# Patient Record
Sex: FEMALE | Race: WHITE | NOT HISPANIC OR LATINO | URBAN - METROPOLITAN AREA
[De-identification: names, ages, dates, MRNs, and addresses within clinical notes are randomized per-mention and may not be internally consistent; named-entity substitution may affect disease eponyms.]

---

## 2017-08-16 ENCOUNTER — OUTPATIENT (OUTPATIENT)
Dept: OUTPATIENT SERVICES | Facility: HOSPITAL | Age: 62
LOS: 1 days | Discharge: HOME | End: 2017-08-16

## 2017-08-16 DIAGNOSIS — C50.411 MALIGNANT NEOPLASM OF UPPER-OUTER QUADRANT OF RIGHT FEMALE BREAST: ICD-10-CM

## 2019-08-14 ENCOUNTER — RECORD ABSTRACTING (OUTPATIENT)
Age: 64
End: 2019-08-14

## 2019-08-14 DIAGNOSIS — E11.9 TYPE 2 DIABETES MELLITUS W/OUT COMPLICATIONS: ICD-10-CM

## 2019-08-14 DIAGNOSIS — C50.411 MALIGNANT NEOPLASM OF UPPER-OUTER QUADRANT OF RIGHT FEMALE BREAST: ICD-10-CM

## 2019-08-14 DIAGNOSIS — Z80.1 FAMILY HISTORY OF MALIGNANT NEOPLASM OF TRACHEA, BRONCHUS AND LUNG: ICD-10-CM

## 2019-08-14 PROBLEM — Z00.00 ENCOUNTER FOR PREVENTIVE HEALTH EXAMINATION: Status: ACTIVE | Noted: 2019-08-14

## 2019-08-14 RX ORDER — DIAPER,BRIEF,INFANT-TODD,DISP
EACH MISCELLANEOUS
Refills: 0 | Status: ACTIVE | COMMUNITY

## 2019-08-14 RX ORDER — B-COMPLEX WITH VITAMIN C
TABLET ORAL
Refills: 0 | Status: ACTIVE | COMMUNITY

## 2019-08-14 RX ORDER — OMEGA-3/DHA/EPA/FISH OIL 300-1000MG
CAPSULE ORAL
Refills: 0 | Status: ACTIVE | COMMUNITY

## 2019-09-12 ENCOUNTER — APPOINTMENT (OUTPATIENT)
Dept: RADIATION ONCOLOGY | Facility: CLINIC | Age: 64
End: 2019-09-12

## 2019-10-31 ENCOUNTER — APPOINTMENT (OUTPATIENT)
Dept: RADIATION ONCOLOGY | Facility: HOSPITAL | Age: 64
End: 2019-10-31

## 2019-11-06 ENCOUNTER — APPOINTMENT (OUTPATIENT)
Dept: RADIATION ONCOLOGY | Facility: HOSPITAL | Age: 64
End: 2019-11-06
Payer: COMMERCIAL

## 2019-11-06 PROCEDURE — 99212 OFFICE O/P EST SF 10 MIN: CPT

## 2019-11-06 RX ORDER — METFORMIN HYDROCHLORIDE 625 MG/1
TABLET ORAL
Refills: 0 | Status: DISCONTINUED | COMMUNITY
End: 2019-11-06

## 2019-11-06 NOTE — PHYSICAL EXAM
[Normal] : normal heart rate and rhythm, normal S1 and S2, and no murmurs present [de-identified] : As previously noted, significant post-operative changes, although otherwise the breast is soft without suspicious findigns.   left breast is negatie.

## 2019-11-06 NOTE — DISEASE MANAGEMENT
[Pathological] : TNM Stage: p [0] : 0 [TTNM] : cis [NTNM] : 0 [MTNM] : 0 [de-identified] : DCIS s/p WBRT   Interval since treatment is 2 yrs

## 2019-11-06 NOTE — HISTORY OF PRESENT ILLNESS
[FreeTextEntry1] : Follow up 11/6/19 Pt  reports chronic left knee pain 8/10 denies any pain to breast. Last mammo from [facility] bilateral 4/2019 is negative, results were given to pt prior.  States right breast is tender to the point that she cannot sleep on that side.

## 2020-10-07 ENCOUNTER — APPOINTMENT (OUTPATIENT)
Dept: RADIATION ONCOLOGY | Facility: HOSPITAL | Age: 65
End: 2020-10-07

## 2020-11-02 ENCOUNTER — APPOINTMENT (OUTPATIENT)
Dept: RADIATION ONCOLOGY | Facility: HOSPITAL | Age: 65
End: 2020-11-02
Payer: COMMERCIAL

## 2020-11-02 VITALS
RESPIRATION RATE: 16 BRPM | SYSTOLIC BLOOD PRESSURE: 153 MMHG | WEIGHT: 151 LBS | OXYGEN SATURATION: 97 % | DIASTOLIC BLOOD PRESSURE: 73 MMHG | HEART RATE: 97 BPM | TEMPERATURE: 97 F

## 2020-11-02 PROCEDURE — 99212 OFFICE O/P EST SF 10 MIN: CPT

## 2020-11-02 NOTE — HISTORY OF PRESENT ILLNESS
[FreeTextEntry1] : 11/2/2020 3 year Follow up: Last mammogram done 6/2020, instructed to repeat in 6 months to follow up on calcifications. Followed up with breast surgeon Dr. Eastman. Followed up with med/onc, taking tamoxifen with complains of dizziness and upset stomach. Continues to struggle with limited range of motion.

## 2020-11-02 NOTE — PHYSICAL EXAM
[Normal] : normal heart rate and rhythm, normal S1 and S2, and no murmurs present [de-identified] : As previously noted, significant post-operative changes, although otherwise the breast is soft without suspicious findigns.   left breast is negative.

## 2020-11-02 NOTE — DISEASE MANAGEMENT
[Pathological] : TNM Stage: p [0] : 0 [TTNM] : cis [NTNM] : 0 [MTNM] : 0 [de-identified] : DCIS s/p WBRT   Interval since treatment is 3 yrs

## 2021-01-27 ENCOUNTER — APPOINTMENT (OUTPATIENT)
Dept: BREAST CENTER | Facility: CLINIC | Age: 66
End: 2021-01-27

## 2021-04-16 ENCOUNTER — APPOINTMENT (OUTPATIENT)
Dept: BREAST CENTER | Facility: CLINIC | Age: 66
End: 2021-04-16

## 2021-05-10 ENCOUNTER — APPOINTMENT (OUTPATIENT)
Dept: BREAST CENTER | Facility: CLINIC | Age: 66
End: 2021-05-10
Payer: COMMERCIAL

## 2021-05-10 VITALS
WEIGHT: 151 LBS | SYSTOLIC BLOOD PRESSURE: 130 MMHG | TEMPERATURE: 97.2 F | HEIGHT: 60 IN | DIASTOLIC BLOOD PRESSURE: 88 MMHG | BODY MASS INDEX: 29.64 KG/M2

## 2021-05-10 DIAGNOSIS — Z86.000 PERSONAL HISTORY OF IN-SITU NEOPLASM OF BREAST: ICD-10-CM

## 2021-05-10 DIAGNOSIS — Z86.39 PERSONAL HISTORY OF OTHER ENDOCRINE, NUTRITIONAL AND METABOLIC DISEASE: ICD-10-CM

## 2021-05-10 DIAGNOSIS — Z80.3 FAMILY HISTORY OF MALIGNANT NEOPLASM OF BREAST: ICD-10-CM

## 2021-05-10 DIAGNOSIS — R92.2 INCONCLUSIVE MAMMOGRAM: ICD-10-CM

## 2021-05-10 DIAGNOSIS — R92.8 OTHER ABNORMAL AND INCONCLUSIVE FINDINGS ON DIAGNOSTIC IMAGING OF BREAST: ICD-10-CM

## 2021-05-10 PROCEDURE — 99203 OFFICE O/P NEW LOW 30 MIN: CPT

## 2021-05-10 PROCEDURE — 99072 ADDL SUPL MATRL&STAF TM PHE: CPT

## 2021-05-12 ENCOUNTER — RESULT REVIEW (OUTPATIENT)
Age: 66
End: 2021-05-12

## 2021-05-12 ENCOUNTER — OUTPATIENT (OUTPATIENT)
Dept: OUTPATIENT SERVICES | Facility: HOSPITAL | Age: 66
LOS: 1 days | Discharge: HOME | End: 2021-05-12
Payer: COMMERCIAL

## 2021-05-12 DIAGNOSIS — R92.8 OTHER ABNORMAL AND INCONCLUSIVE FINDINGS ON DIAGNOSTIC IMAGING OF BREAST: ICD-10-CM

## 2021-05-12 PROBLEM — Z80.3 FAMILY HISTORY OF BREAST CANCER: Status: ACTIVE | Noted: 2019-08-14

## 2021-05-12 PROBLEM — Z86.39 HISTORY OF DIABETES MELLITUS: Status: RESOLVED | Noted: 2021-05-12 | Resolved: 2021-05-12

## 2021-05-12 PROBLEM — Z86.000 HISTORY OF DUCTAL CARCINOMA IN SITU (DCIS) OF BREAST: Status: ACTIVE | Noted: 2021-05-12

## 2021-05-12 PROCEDURE — G0279: CPT | Mod: 26

## 2021-05-12 PROCEDURE — 76641 ULTRASOUND BREAST COMPLETE: CPT | Mod: 26,50

## 2021-05-12 PROCEDURE — 77066 DX MAMMO INCL CAD BI: CPT | Mod: 26

## 2021-05-12 NOTE — PHYSICAL EXAM
[No Axillary Lymphadenopathy] : no left axillary lymphadenopathy [Soft] : abdomen soft [Not Tender] : non-tender [No Edema] : no edema [No Rashes] : no rashes [No Ulceration] : no ulceration [Normocephalic] : normocephalic [Atraumatic] : atraumatic [EOMI] : extra ocular movement intact [No Supraclavicular Adenopathy] : no supraclavicular adenopathy [No Cervical Adenopathy] : no cervical adenopathy [Examined in the supine and seated position] : examined in the supine and seated position [Symmetrical] : symmetrical [No dominant masses] : no dominant masses in right breast  [No dominant masses] : no dominant masses left breast [No Nipple Retraction] : no left nipple retraction [No Nipple Discharge] : no left nipple discharge [de-identified] : surgical incision is well healed, but no suspicious abnormalities palpated  [de-identified] : no suspicious abnormalities

## 2021-05-12 NOTE — ASSESSMENT
[FreeTextEntry1] : Ofelia is a 65 F who presents with a history of b/l DCIS, now with a R breast BIRADS 3 calcifications. \par \par ON exam, I was not able to palpate any suspicious abnormalities within either breast. \par \par Her most recent imaging was a R dx mammogram on 12/30/2020 which revealed stable R breast calcifications, deemed BIRADS 3. \par \par She is due for a b/l dx mammogram and US now.  This will be scheduled for her today.  If unrevealing, she can follow up in 1 year after a b/l dx mammogram.  \par \par All of her questions were answered.  She knows to call with any further questions or concerns. \par \par PLAN: \par -b/l dx mammogram and US now\par -R Stereobx for BIRADS 3 abnormalities vs. continued observation

## 2021-05-12 NOTE — PAST MEDICAL HISTORY
[Menarche Age ____] : age at menarche was [unfilled] [Menopause Age____] : age at menopause was [unfilled] [History of Hormone Replacement Treatment] : has no history of hormone replacement treatment [Total Preg ___] : G[unfilled] [Live Births ___] : P[unfilled]  [Age At Live Birth ___] : Age at live birth: [unfilled] [FreeTextEntry5] : denies  [FreeTextEntry6] : denies [FreeTextEntry7] : denies [FreeTextEntry8] : denies

## 2021-05-12 NOTE — REVIEW OF SYSTEMS
[Negative] : Heme/Lymph [As Noted in HPI] : as noted in HPI [Skin Lesions] : no skin lesions [Skin Wound] : no skin wound [Breast Pain] : no breast pain [Breast Lump] : no breast lump

## 2021-05-12 NOTE — HISTORY OF PRESENT ILLNESS
[FreeTextEntry1] : Ofelia is a 65 F who presents with a history of R breast DCIS, treated in 2017, history of L breast DCIS, treated in , seeking to establish care with a breast surgeon. \par \par Her past breast history is as follows: \par -s/p L WLE, no RT, no endocrine \par -s/p R WLE, R WBI, tamoxifen x 4 years \par -stopped tamoxifen 6 months prior for side effects\par \par She has no breast related complaints at this time.  She denies any breast pain, has not palpated any new palpable masses in either breast and denies any nipple discharge or retraction.  \par \par Her most recent imaging was performed on 2020, a R dx mammogram which revealed benign appearing probable fat necrosis type calcifications @ lumpectomy site, unchanged, deemed BIRADS 3. \par \par HISTORICAL RISK FACTORS: \par -prior breast biopsies and surgeries as above \par -family history of breast cancer in her mother at age 35 \par -, age at first live birth was 25 \par -no prior OCP use \par -no gyn surgeries\par

## 2021-05-13 PROBLEM — R92.8 ABNORMAL FINDING ON BREAST IMAGING: Status: ACTIVE | Noted: 2021-05-13

## 2021-05-14 ENCOUNTER — NON-APPOINTMENT (OUTPATIENT)
Age: 66
End: 2021-05-14

## 2021-06-04 ENCOUNTER — OUTPATIENT (OUTPATIENT)
Dept: OUTPATIENT SERVICES | Facility: HOSPITAL | Age: 66
LOS: 1 days | Discharge: HOME | End: 2021-06-04

## 2021-06-04 ENCOUNTER — APPOINTMENT (OUTPATIENT)
Dept: HEMATOLOGY ONCOLOGY | Facility: CLINIC | Age: 66
End: 2021-06-04
Payer: COMMERCIAL

## 2021-06-04 VITALS
HEIGHT: 60 IN | DIASTOLIC BLOOD PRESSURE: 81 MMHG | SYSTOLIC BLOOD PRESSURE: 146 MMHG | HEART RATE: 88 BPM | WEIGHT: 150 LBS | BODY MASS INDEX: 29.45 KG/M2 | TEMPERATURE: 97.3 F

## 2021-06-04 DIAGNOSIS — Z79.811 LONG TERM (CURRENT) USE OF AROMATASE INHIBITORS: ICD-10-CM

## 2021-06-04 DIAGNOSIS — D05.11 INTRADUCTAL CARCINOMA IN SITU OF RIGHT BREAST: ICD-10-CM

## 2021-06-04 PROCEDURE — 99204 OFFICE O/P NEW MOD 45 MIN: CPT

## 2021-06-04 RX ORDER — ANASTROZOLE TABLETS 1 MG/1
1 TABLET ORAL
Qty: 90 | Refills: 1 | Status: ACTIVE | COMMUNITY
Start: 2021-06-04 | End: 1900-01-01

## 2021-06-04 RX ORDER — REPAGLINIDE 1 MG/1
TABLET ORAL
Refills: 0 | Status: ACTIVE | COMMUNITY

## 2021-06-07 ENCOUNTER — NON-APPOINTMENT (OUTPATIENT)
Age: 66
End: 2021-06-07

## 2021-06-12 PROBLEM — D05.11 DUCTAL CARCINOMA IN SITU (DCIS) OF RIGHT BREAST: Status: ACTIVE | Noted: 2019-11-06

## 2021-06-12 PROBLEM — Z79.811 AROMATASE INHIBITOR USE: Status: ACTIVE | Noted: 2021-06-04

## 2021-06-12 NOTE — ASSESSMENT
[FreeTextEntry1] : 65 year old post menopausal female with h/o b/l breast DCIS. \par \par # Lt breast DCIS in 2011 s/p L WLE, no RT - s/p endocrine therapy with tamoxifen for 5 years. \par # Rt Breast DCIS in 2017 - underwent excision 3 times as her margins were positive and received WBRT- DCIS high nuclear grade with ALH, ER 99%- No endocrine therapy. \par \par Recommendations:\par We discussed the diagnosis, prognosis and management of DCIS with pt in detail. She was found to have DCIS in Lt breast in 2011 and underwent  L WLE, no RT was done and took endocrine therapy with tamoxifen for 5 years. \par \par She was again found to have Rt breast DCIS in 2017 -high nuclear grade with ALH, ER 99% s/p lumpectomy and WBRT. But she was unable to take endocrine therapy due to S. E. She does not remember the name of the medication. \par \par She is concerning the risk of breast cancer and wants to try endocrine therapy although it has been 4 years since her last DCIS diagnosis. We discussed the choice of adjuvant endocrine therapy including Tamoxifen and Anastrozole. 5-year Tamoxifen is the standard adjuvant endocrine therapy for hormone receptor positive DCIS. The potential side effects may include but not limit to a small increased risk for endodermal cancer, increased risk for cardiovascular events, thrombosis, vasomotor symptoms, depression and cataract. Anastrozole is another option. In the randomized clinical trial, Anastrozole demonstrated comparable efficacy to Tamoxifen but better side effect profile. The main side effects associated with Anastrozole are muscular and skeletal aching, arthralgia, loss of bone density, osteopenia and osteoporosis, a small increase risk of cardiovascular events and slightly increase of hyperlipidemia. She opted to take Anastrozole. A script was sent to her pharmacy. \par \par We discussed bone health management given AI may reduce bone density. She is advised to take calcium and vitamin D supplement. We will order Dexa scan. \par \par Prescription for Anastrozole was sent to her pharmacy. \par \par She will get her repeat mammogram and US b/l breast in 6 months- Due in Nov 2021. \par \par All her questions ere answered appropriately. She will come back for followup visit in 3 months.\par \par Patient was seen and examined and discussed with Dr Flannery who agrees with the plan of care. \par

## 2021-06-12 NOTE — CONSULT LETTER
[Dear  ___] : Dear  [unfilled], [Consult Letter:] : I had the pleasure of evaluating your patient, [unfilled]. [Please see my note below.] : Please see my note below. [Consult Closing:] : Thank you very much for allowing me to participate in the care of this patient.  If you have any questions, please do not hesitate to contact me. [Sincerely,] : Sincerely, [FreeTextEntry3] : Danilo Flannery MD

## 2021-06-12 NOTE — HISTORY OF PRESENT ILLNESS
[de-identified] : 65 female is referred by Dr. Billy for consultation of adjuvant endocrine therapy for DCIS.  Patient has a history of Lt breast DCIS in 2011 and underwent  L WLE at [facility] , no RT,  took endocrine therapy with tamoxifen for 5 years. In 2017, she was diagnosed with Rt Breast DCIS and underwent excision 3 times as margins were positive and received WBRT- DCIS high nuclear grade with ALH, ER 99%. She saw medical Oncologist at [facility] and tried on 3 different endocrine therapy(names unknown) but she was unable to tolerate due to S.E of musculoskeletal pains, GI symptoms and anxiety issues. She has not taken any endocrine therapy since 2018. \par She goes for routine mammograms now and her last b/l mammo and US b./l breast in May 2021 showed BIRADS3- 6 months follow up was recommended. \par \par She has no breast related complaints at this time. She denies any breast pain, has not palpated any new palpable masses in either breast and denies any nipple discharge or retraction. No prior h/o stroke, CAD, DVT or PE. \par She is active and works in the de[employer] of [employer]. \par \par Family history of breast cancer in her mother at age 35 , Lung cancer in dad at 50s (smoker)\par Patient also stated that she was tested for BRCA mutation and was negative. \par \par \par \par

## 2021-06-12 NOTE — PHYSICAL EXAM
[Restricted in physically strenuous activity but ambulatory and able to carry out work of a light or sedentary nature] : Status 1- Restricted in physically strenuous activity but ambulatory and able to carry out work of a light or sedentary nature, e.g., light house work, office work [Normal] : affect appropriate [de-identified] : Right Breast: Deformed from prior multiple surgeries of the Rt breast. surgical incision noted.  no suspicious abnormalities palpated \par Left Breast: no suspicious abnormalities

## 2021-07-16 DIAGNOSIS — Z79.811 LONG TERM (CURRENT) USE OF AROMATASE INHIBITORS: ICD-10-CM

## 2021-07-16 DIAGNOSIS — D05.11 INTRADUCTAL CARCINOMA IN SITU OF RIGHT BREAST: ICD-10-CM

## 2021-11-04 ENCOUNTER — APPOINTMENT (OUTPATIENT)
Dept: RADIATION ONCOLOGY | Facility: HOSPITAL | Age: 66
End: 2021-11-04

## 2022-02-03 ENCOUNTER — APPOINTMENT (OUTPATIENT)
Dept: HEMATOLOGY ONCOLOGY | Facility: CLINIC | Age: 67
End: 2022-02-03

## 2022-03-11 ENCOUNTER — OUTPATIENT (OUTPATIENT)
Dept: OUTPATIENT SERVICES | Facility: HOSPITAL | Age: 67
LOS: 1 days | Discharge: HOME | End: 2022-03-11
Payer: COMMERCIAL

## 2022-03-11 DIAGNOSIS — E07.9 DISORDER OF THYROID, UNSPECIFIED: ICD-10-CM

## 2022-03-11 PROCEDURE — 76536 US EXAM OF HEAD AND NECK: CPT | Mod: 26

## 2022-11-21 ENCOUNTER — RX ONLY (RX ONLY)
Age: 67
End: 2022-11-21

## 2022-11-21 ENCOUNTER — OFFICE (OUTPATIENT)
Dept: URBAN - METROPOLITAN AREA CLINIC 92 | Facility: CLINIC | Age: 67
Setting detail: OPHTHALMOLOGY
End: 2022-11-21
Payer: COMMERCIAL

## 2022-11-21 DIAGNOSIS — H25.13: ICD-10-CM

## 2022-11-21 DIAGNOSIS — H01.001: ICD-10-CM

## 2022-11-21 DIAGNOSIS — H43.813: ICD-10-CM

## 2022-11-21 DIAGNOSIS — E11.9: ICD-10-CM

## 2022-11-21 DIAGNOSIS — H18.513: ICD-10-CM

## 2022-11-21 DIAGNOSIS — H01.002: ICD-10-CM

## 2022-11-21 PROBLEM — H01.004 BLEPHARITIS; RIGHT UPPER LID, RIGHT LOWER LID, LEFT UPPER LID, LEFT LOWER LID: Status: ACTIVE | Noted: 2022-11-21

## 2022-11-21 PROBLEM — H01.005 BLEPHARITIS; RIGHT UPPER LID, RIGHT LOWER LID, LEFT UPPER LID, LEFT LOWER LID: Status: ACTIVE | Noted: 2022-11-21

## 2022-11-21 PROCEDURE — 92025 CPTRIZED CORNEAL TOPOGRAPHY: CPT | Performed by: SPECIALIST

## 2022-11-21 PROCEDURE — 92014 COMPRE OPH EXAM EST PT 1/>: CPT | Performed by: SPECIALIST

## 2022-11-21 PROCEDURE — 92134 CPTRZ OPH DX IMG PST SGM RTA: CPT | Performed by: SPECIALIST

## 2022-11-21 PROCEDURE — 92136 OPHTHALMIC BIOMETRY: CPT | Performed by: SPECIALIST

## 2022-11-21 ASSESSMENT — KERATOMETRY
OS_CYLPOWER_DEGREES: 1
OD_AXISANGLE2_DEGREES: 29
OD_K1K2_AVERAGE: 43.125
OS_AXISANGLE_DEGREES: 172
OS_K2POWER_DIOPTERS: 43.50
OS_K1POWER_DIOPTERS: 42.50
OD_K2POWER_DIOPTERS: 43.50
OD_CYLAXISANGLE_DEGREES: 29
OD_AXISANGLE_DEGREES: 119
OD_AXISANGLE_DEGREES: 29
OD_K1POWER_DIOPTERS: 42.75
OS_CYLAXISANGLE_DEGREES: 172
OS_K1POWER_DIOPTERS: 42.50
OD_K1POWER_DIOPTERS: 42.75
OS_AXISANGLE2_DEGREES: 172
OS_AXISANGLE_DEGREES: 82
OD_CYLPOWER_DEGREES: 0.75
OS_K2POWER_DIOPTERS: 43.50
OD_K2POWER_DIOPTERS: 43.50
OS_K1K2_AVERAGE: 43

## 2022-11-21 ASSESSMENT — REFRACTION_CURRENTRX
OD_SPHERE: +4.25
OS_AXIS: 78
OD_OVR_VA: 20/
OS_SPHERE: +4.50
OD_VPRISM_DIRECTION: BF
OS_CYLINDER: -1.25
OS_OVR_VA: 20/
OD_AXIS: 107
OD_ADD: +2.50
OD_CYLINDER: -1.75
OS_VPRISM_DIRECTION: BF
OS_ADD: +2.50

## 2022-11-21 ASSESSMENT — TONOMETRY
OD_IOP_MMHG: 20
OS_IOP_MMHG: 20

## 2022-11-21 ASSESSMENT — REFRACTION_AUTOREFRACTION
OS_CYLINDER: +1.50
OD_SPHERE: +2.25
OD_AXIS: 23
OD_CYLINDER: +1.75
OS_SPHERE: +3.50
OS_AXIS: 167

## 2022-11-21 ASSESSMENT — SPHEQUIV_DERIVED
OS_SPHEQUIV: 4.25
OD_SPHEQUIV: 2.75
OS_SPHEQUIV: 4.375
OD_SPHEQUIV: 3.125

## 2022-11-21 ASSESSMENT — CORNEAL DYSTROPHY - POSTERIOR
OD_POSTERIORDYSTROPHY: 1+ GUTTATA
OS_POSTERIORDYSTROPHY: 1+ GUTTATA

## 2022-11-21 ASSESSMENT — AXIALLENGTH_DERIVED
OS_AL: 22.2062
OD_AL: 22.5594
OS_AL: 22.1631
OD_AL: 22.6938

## 2022-11-21 ASSESSMENT — LID EXAM ASSESSMENTS
OD_MEIBOMITIS: RLL RUL 2+
OS_MEIBOMITIS: LLL LUL 2+
OD_COMMENTS: W/ COLLARETTES
OS_COMMENTS: W/ COLLARETTES
OS_BLEPHARITIS: 1+
OD_BLEPHARITIS: 1+

## 2022-11-21 ASSESSMENT — REFRACTION_MANIFEST
OS_VA1: 20/25
OS_SPHERE: +4.75
OD_AXIS: 120
OD_VA1: 20/25
OS_ADD: +2.50
OS_CYLINDER: -0.75
OD_CYLINDER: -1.00
OS_AXIS: 80
OD_ADD: +2.50
OU_VA: 20/25
OD_SPHERE: +3.25

## 2022-11-21 ASSESSMENT — VISUAL ACUITY
OD_BCVA: 20/25-2
OS_BCVA: 20/25

## 2022-11-21 ASSESSMENT — CONFRONTATIONAL VISUAL FIELD TEST (CVF)
OD_FINDINGS: FULL
OS_FINDINGS: FULL

## 2023-04-11 ENCOUNTER — AMBULATORY SURGERY CENTER (OUTPATIENT)
Dept: URBAN - METROPOLITAN AREA SURGERY 23 | Facility: SURGERY | Age: 68
Setting detail: OPHTHALMOLOGY
End: 2023-04-11
Payer: COMMERCIAL

## 2023-04-11 DIAGNOSIS — H25.11: ICD-10-CM

## 2023-04-11 PROCEDURE — 66984 XCAPSL CTRC RMVL W/O ECP: CPT | Performed by: SPECIALIST

## 2023-04-12 ENCOUNTER — OFFICE (OUTPATIENT)
Dept: URBAN - METROPOLITAN AREA CLINIC 92 | Facility: CLINIC | Age: 68
Setting detail: OPHTHALMOLOGY
End: 2023-04-12
Payer: COMMERCIAL

## 2023-04-12 ENCOUNTER — RX ONLY (RX ONLY)
Age: 68
End: 2023-04-12

## 2023-04-12 DIAGNOSIS — H18.513: ICD-10-CM

## 2023-04-12 DIAGNOSIS — H25.13: ICD-10-CM

## 2023-04-12 PROCEDURE — 99024 POSTOP FOLLOW-UP VISIT: CPT | Performed by: OPHTHALMOLOGY

## 2023-04-12 ASSESSMENT — REFRACTION_MANIFEST
OD_VA1: 20/25
OS_SPHERE: +4.75
OS_VA1: 20/25
OD_CYLINDER: -1.00
OS_AXIS: 80
OU_VA: 20/25
OD_SPHERE: +3.25
OS_ADD: +2.50
OD_AXIS: 120
OS_CYLINDER: -0.75
OD_ADD: +2.50

## 2023-04-12 ASSESSMENT — REFRACTION_CURRENTRX
OS_VPRISM_DIRECTION: BF
OS_CYLINDER: -1.25
OD_VPRISM_DIRECTION: BF
OD_ADD: +2.50
OD_CYLINDER: -1.75
OD_SPHERE: +4.25
OS_SPHERE: +4.50
OS_AXIS: 78
OD_OVR_VA: 20/
OD_AXIS: 107
OS_OVR_VA: 20/
OS_ADD: +2.50

## 2023-04-12 ASSESSMENT — KERATOMETRY
OD_K1POWER_DIOPTERS: 42.75
OD_K2POWER_DIOPTERS: 43.50
OS_AXISANGLE_DEGREES: 172
OS_K2POWER_DIOPTERS: 43.50
OS_K1POWER_DIOPTERS: 42.50
OD_AXISANGLE_DEGREES: 29

## 2023-04-12 ASSESSMENT — REFRACTION_AUTOREFRACTION
OD_AXIS: 23
OS_SPHERE: +3.50
OS_CYLINDER: +1.50
OS_AXIS: 167
OD_CYLINDER: +1.75
OD_SPHERE: +2.25

## 2023-04-12 ASSESSMENT — SPHEQUIV_DERIVED
OD_SPHEQUIV: 3.125
OS_SPHEQUIV: 4.375
OD_SPHEQUIV: 2.75
OS_SPHEQUIV: 4.25

## 2023-04-12 ASSESSMENT — CORNEAL EDEMA - FOLDS/STRIAE: OD_FOLDSSTRIAE: 2+

## 2023-04-12 ASSESSMENT — AXIALLENGTH_DERIVED
OD_AL: 22.5594
OS_AL: 22.1631
OS_AL: 22.2062
OD_AL: 22.6938

## 2023-04-12 ASSESSMENT — CORNEAL DYSTROPHY - POSTERIOR
OD_POSTERIORDYSTROPHY: 1+ GUTTATA
OS_POSTERIORDYSTROPHY: 1+ GUTTATA

## 2023-04-12 ASSESSMENT — VISUAL ACUITY
OD_BCVA: 20/25-2
OS_BCVA: 20/150-

## 2023-04-12 ASSESSMENT — CORNEAL EDEMA - MICROCYSTIC EPITHELIAL EDEMA (MCE): OD_MCE: 1+

## 2023-04-18 ENCOUNTER — OFFICE (OUTPATIENT)
Dept: URBAN - METROPOLITAN AREA CLINIC 92 | Facility: CLINIC | Age: 68
Setting detail: OPHTHALMOLOGY
End: 2023-04-18
Payer: COMMERCIAL

## 2023-04-18 ENCOUNTER — RX ONLY (RX ONLY)
Age: 68
End: 2023-04-18

## 2023-04-18 DIAGNOSIS — Z96.1: ICD-10-CM

## 2023-04-18 PROCEDURE — 99024 POSTOP FOLLOW-UP VISIT: CPT | Performed by: SPECIALIST

## 2023-04-18 ASSESSMENT — LID EXAM ASSESSMENTS
OD_BLEPHARITIS: 1+
OD_COMMENTS: W/ COLLARETTES
OS_MEIBOMITIS: LLL LUL 2+
OS_BLEPHARITIS: 1+
OD_MEIBOMITIS: RLL RUL 2+
OS_COMMENTS: W/ COLLARETTES

## 2023-04-18 ASSESSMENT — KERATOMETRY
OD_K2POWER_DIOPTERS: 43.00
OD_AXISANGLE_DEGREES: 030
OS_K2POWER_DIOPTERS: 43.50
OD_K1POWER_DIOPTERS: 42.50
METHOD_AUTO_MANUAL: AUTO
OS_K1POWER_DIOPTERS: 42.50
OS_AXISANGLE_DEGREES: 178

## 2023-04-18 ASSESSMENT — TONOMETRY: OD_IOP_MMHG: 11

## 2023-04-18 ASSESSMENT — REFRACTION_CURRENTRX
OS_VPRISM_DIRECTION: BF
OS_ADD: +2.50
OS_OVR_VA: 20/
OD_AXIS: 107
OD_VPRISM_DIRECTION: BF
OS_AXIS: 78
OD_OVR_VA: 20/
OD_CYLINDER: -1.75
OS_CYLINDER: -1.25
OD_SPHERE: +4.25
OD_ADD: +2.50
OS_SPHERE: +4.50

## 2023-04-18 ASSESSMENT — REFRACTION_AUTOREFRACTION
OS_AXIS: 170
OS_SPHERE: +3.50
OS_CYLINDER: +1.25
OD_SPHERE: -1.50
OD_CYLINDER: +1.50
OD_AXIS: 024

## 2023-04-18 ASSESSMENT — CORNEAL EDEMA - MICROCYSTIC EPITHELIAL EDEMA (MCE): OD_MCE: 1+

## 2023-04-18 ASSESSMENT — REFRACTION_MANIFEST
OS_CYLINDER: -0.75
OS_VA1: 20/25
OD_AXIS: 120
OS_SPHERE: +4.75
OS_ADD: +2.50
OD_CYLINDER: -1.00
OD_VA1: 20/25
OD_ADD: +2.50
OU_VA: 20/25
OD_SPHERE: +3.25
OS_AXIS: 80

## 2023-04-18 ASSESSMENT — CORNEAL DYSTROPHY - POSTERIOR
OS_POSTERIORDYSTROPHY: 1+ GUTTATA
OD_POSTERIORDYSTROPHY: 1+ GUTTATA

## 2023-04-18 ASSESSMENT — AXIALLENGTH_DERIVED
OD_AL: 22.8219
OS_AL: 22.1631
OS_AL: 22.2494
OD_AL: 24.1736

## 2023-04-18 ASSESSMENT — SPHEQUIV_DERIVED
OS_SPHEQUIV: 4.375
OS_SPHEQUIV: 4.125
OD_SPHEQUIV: -0.75
OD_SPHEQUIV: 2.75

## 2023-04-18 ASSESSMENT — VISUAL ACUITY
OS_BCVA: 20/40+2
OD_BCVA: 20/25-2

## 2023-04-18 ASSESSMENT — CORNEAL EDEMA - FOLDS/STRIAE: OD_FOLDSSTRIAE: T 1+

## 2023-04-25 ENCOUNTER — AMBULATORY SURGERY CENTER (OUTPATIENT)
Dept: URBAN - METROPOLITAN AREA CLINIC 75 | Facility: CLINIC | Age: 68
Setting detail: OPHTHALMOLOGY
End: 2023-04-25
Payer: COMMERCIAL

## 2023-04-25 DIAGNOSIS — H25.12: ICD-10-CM

## 2023-04-25 PROCEDURE — 66984 XCAPSL CTRC RMVL W/O ECP: CPT | Performed by: SPECIALIST

## 2023-04-26 ENCOUNTER — OFFICE (OUTPATIENT)
Dept: URBAN - METROPOLITAN AREA CLINIC 76 | Facility: CLINIC | Age: 68
Setting detail: OPHTHALMOLOGY
End: 2023-04-26
Payer: COMMERCIAL

## 2023-04-26 DIAGNOSIS — Z96.1: ICD-10-CM

## 2023-04-26 PROCEDURE — 99024 POSTOP FOLLOW-UP VISIT: CPT | Performed by: OPHTHALMOLOGY

## 2023-04-26 ASSESSMENT — REFRACTION_MANIFEST
OS_CYLINDER: -0.75
OD_CYLINDER: -1.00
OS_SPHERE: +4.75
OD_SPHERE: +3.25
OS_VA1: 20/25
OD_VA1: 20/25
OD_ADD: +2.50
OU_VA: 20/25
OS_AXIS: 80
OD_AXIS: 120
OS_ADD: +2.50

## 2023-04-26 ASSESSMENT — REFRACTION_AUTOREFRACTION
OS_SPHERE: +0.75
OS_AXIS: 44
OS_CYLINDER: -1.50
OD_CYLINDER: -1.25
OD_AXIS: 106
OD_SPHERE: -0.25

## 2023-04-26 ASSESSMENT — SPHEQUIV_DERIVED
OS_SPHEQUIV: 0
OD_SPHEQUIV: 2.75
OD_SPHEQUIV: -0.875
OS_SPHEQUIV: 4.375

## 2023-04-26 ASSESSMENT — REFRACTION_CURRENTRX
OD_AXIS: 107
OD_OVR_VA: 20/
OS_CYLINDER: -1.25
OD_CYLINDER: -1.75
OS_OVR_VA: 20/
OD_VPRISM_DIRECTION: BF
OS_ADD: +2.50
OD_ADD: +2.50
OS_AXIS: 78
OS_VPRISM_DIRECTION: BF
OD_SPHERE: +4.25
OS_SPHERE: +4.50

## 2023-04-26 ASSESSMENT — LID EXAM ASSESSMENTS
OD_COMMENTS: W/ COLLARETTES
OD_MEIBOMITIS: RLL RUL 2+
OS_COMMENTS: W/ COLLARETTES
OS_BLEPHARITIS: 1+
OD_BLEPHARITIS: 1+
OS_MEIBOMITIS: LLL LUL 2+

## 2023-04-26 ASSESSMENT — CORNEAL DYSTROPHY - POSTERIOR
OD_POSTERIORDYSTROPHY: 1+ GUTTATA
OS_POSTERIORDYSTROPHY: 1+ GUTTATA

## 2023-04-26 ASSESSMENT — KERATOMETRY
METHOD_AUTO_MANUAL: AUTO
OS_AXISANGLE_DEGREES: 134
OS_K2POWER_DIOPTERS: 42.75
OD_K2POWER_DIOPTERS: 43.50
OS_K1POWER_DIOPTERS: 42.25
OD_AXISANGLE_DEGREES: 29
OD_K1POWER_DIOPTERS: 42.50

## 2023-04-26 ASSESSMENT — AXIALLENGTH_DERIVED
OS_AL: 23.965
OD_AL: 22.7363
OD_AL: 24.1284
OS_AL: 22.3264

## 2023-04-26 ASSESSMENT — VISUAL ACUITY
OD_BCVA: 20/40
OS_BCVA: 20/40-1

## 2023-04-26 ASSESSMENT — TONOMETRY
OD_IOP_MMHG: 16
OS_IOP_MMHG: 21

## 2023-04-26 ASSESSMENT — CORNEAL EDEMA - FOLDS/STRIAE: OD_FOLDSSTRIAE: T 1+

## 2023-04-26 ASSESSMENT — CORNEAL EDEMA - MICROCYSTIC EPITHELIAL EDEMA (MCE): OD_MCE: T

## 2023-05-03 ENCOUNTER — OFFICE (OUTPATIENT)
Dept: URBAN - METROPOLITAN AREA CLINIC 76 | Facility: CLINIC | Age: 68
Setting detail: OPHTHALMOLOGY
End: 2023-05-03
Payer: COMMERCIAL

## 2023-05-03 ENCOUNTER — RX ONLY (RX ONLY)
Age: 68
End: 2023-05-03

## 2023-05-03 DIAGNOSIS — H52.203: ICD-10-CM

## 2023-05-03 DIAGNOSIS — Z96.1: ICD-10-CM

## 2023-05-03 PROCEDURE — 92015 DETERMINE REFRACTIVE STATE: CPT | Performed by: SPECIALIST

## 2023-05-03 PROCEDURE — 99024 POSTOP FOLLOW-UP VISIT: CPT | Performed by: SPECIALIST

## 2023-05-03 ASSESSMENT — LID EXAM ASSESSMENTS
OD_COMMENTS: W/ COLLARETTES
OD_MEIBOMITIS: RLL RUL 2+
OS_BLEPHARITIS: 1+
OS_COMMENTS: W/ COLLARETTES
OS_MEIBOMITIS: LLL LUL 2+
OD_BLEPHARITIS: 1+

## 2023-05-03 ASSESSMENT — SPHEQUIV_DERIVED
OD_SPHEQUIV: 2.75
OS_SPHEQUIV: 4.375
OD_SPHEQUIV: -1.125
OS_SPHEQUIV: -1.125
OS_SPHEQUIV: -1

## 2023-05-03 ASSESSMENT — REFRACTION_MANIFEST
OS_VA1: 20/25
OD_SPHERE: +3.25
OU_VA: 20/25
OS_CYLINDER: -0.75
OD_AXIS: 107
OD_CYLINDER: -1.00
OS_ADD: +2.50
OS_AXIS: 73
OD_VA1: 20/25
OD_CYLINDER: -1.25
OS_SPHERE: -0.50
OD_AXIS: 120
OS_ADD: +2.75
OD_ADD: +2.75
OD_ADD: +2.50
OS_AXIS: 80
OS_SPHERE: +4.75
OS_CYLINDER: -1.25
OD_SPHERE: PLANO

## 2023-05-03 ASSESSMENT — VISUAL ACUITY
OS_BCVA: 20/60-1
OD_BCVA: 20/40+/-

## 2023-05-03 ASSESSMENT — REFRACTION_AUTOREFRACTION
OS_SPHERE: -0.25
OD_SPHERE: -0.50
OD_CYLINDER: -1.25
OS_AXIS: 077
OS_CYLINDER: -1.50
OD_AXIS: 113

## 2023-05-03 ASSESSMENT — CORNEAL DYSTROPHY - POSTERIOR
OS_POSTERIORDYSTROPHY: 1+ GUTTATA
OD_POSTERIORDYSTROPHY: 1+ GUTTATA

## 2023-05-03 ASSESSMENT — TONOMETRY
OS_IOP_MMHG: 16
OD_IOP_MMHG: 18

## 2023-05-03 ASSESSMENT — REFRACTION_CURRENTRX
OS_SPHERE: +4.50
OD_OVR_VA: 20/
OD_ADD: +2.50
OS_AXIS: 78
OS_CYLINDER: -1.25
OD_VPRISM_DIRECTION: BF
OS_ADD: +2.50
OS_OVR_VA: 20/
OS_VPRISM_DIRECTION: BF
OD_SPHERE: +4.25
OD_CYLINDER: -1.75
OD_AXIS: 107

## 2023-05-03 ASSESSMENT — KERATOMETRY
OS_K2POWER_DIOPTERS: 43.50
OS_AXISANGLE_DEGREES: 165
OD_K2POWER_DIOPTERS: 43.50
OD_K1POWER_DIOPTERS: 42.75
OD_AXISANGLE_DEGREES: 036
METHOD_AUTO_MANUAL: AUTO
OS_K1POWER_DIOPTERS: 42.75

## 2023-05-03 ASSESSMENT — AXIALLENGTH_DERIVED
OD_AL: 24.1824
OS_AL: 24.1824
OS_AL: 22.1227
OS_AL: 24.1313
OD_AL: 22.6938

## 2023-05-03 ASSESSMENT — CORNEAL EDEMA CLINICAL DESCRIPTION
OS_CORNEALEDEMA: ABSENT
OD_CORNEALEDEMA: ABSENT

## 2023-05-03 ASSESSMENT — CONFRONTATIONAL VISUAL FIELD TEST (CVF)
OS_FINDINGS: FULL
OD_FINDINGS: FULL

## 2023-06-07 ENCOUNTER — OFFICE (OUTPATIENT)
Dept: URBAN - METROPOLITAN AREA CLINIC 76 | Facility: CLINIC | Age: 68
Setting detail: OPHTHALMOLOGY
End: 2023-06-07
Payer: COMMERCIAL

## 2023-06-07 DIAGNOSIS — Z96.1: ICD-10-CM

## 2023-06-07 PROBLEM — H10.45 ALLERGIC CONJUNCTIVITIS: Status: ACTIVE | Noted: 2023-06-07

## 2023-06-07 PROCEDURE — 99024 POSTOP FOLLOW-UP VISIT: CPT | Performed by: SPECIALIST

## 2023-06-07 ASSESSMENT — REFRACTION_MANIFEST
OD_ADD: +2.50
OS_ADD: +2.75
OS_AXIS: 80
OS_SPHERE: -0.50
OD_VA1: 20/25
OD_AXIS: 120
OD_CYLINDER: -1.25
OS_AXIS: 73
OS_CYLINDER: -1.25
OD_SPHERE: PLANO
OS_VA1: 20/25
OU_VA: 20/25
OS_SPHERE: +4.75
OD_ADD: +2.75
OD_CYLINDER: -1.00
OD_AXIS: 107
OS_CYLINDER: -0.75
OS_ADD: +2.50
OD_SPHERE: +3.25

## 2023-06-07 ASSESSMENT — LID EXAM ASSESSMENTS
OD_COMMENTS: W/ COLLARETTES
OD_BLEPHARITIS: 1+
OS_COMMENTS: W/ COLLARETTES
OD_MEIBOMITIS: RLL RUL 2+
OS_BLEPHARITIS: 1+
OS_MEIBOMITIS: LLL LUL 2+

## 2023-06-07 ASSESSMENT — AXIALLENGTH_DERIVED
OS_AL: 24.1313
OD_AL: 22.6938
OS_AL: 24.1824
OD_AL: 24.1824
OS_AL: 22.1227

## 2023-06-07 ASSESSMENT — KERATOMETRY
OD_AXISANGLE_DEGREES: 036
OS_K2POWER_DIOPTERS: 43.50
OD_K2POWER_DIOPTERS: 43.50
OD_K1POWER_DIOPTERS: 42.75
OS_K1POWER_DIOPTERS: 42.75
OS_AXISANGLE_DEGREES: 165
METHOD_AUTO_MANUAL: AUTO

## 2023-06-07 ASSESSMENT — REFRACTION_CURRENTRX
OS_CYLINDER: -1.25
OS_OVR_VA: 20/
OS_VPRISM_DIRECTION: BF
OS_ADD: +2.50
OS_AXIS: 78
OD_VPRISM_DIRECTION: BF
OD_CYLINDER: -1.75
OD_ADD: +2.50
OD_SPHERE: +4.25
OD_AXIS: 107
OD_OVR_VA: 20/
OS_SPHERE: +4.50

## 2023-06-07 ASSESSMENT — SPHEQUIV_DERIVED
OD_SPHEQUIV: 2.75
OD_SPHEQUIV: -1.125
OS_SPHEQUIV: 4.375
OS_SPHEQUIV: -1.125
OS_SPHEQUIV: -1

## 2023-06-07 ASSESSMENT — REFRACTION_AUTOREFRACTION
OD_SPHERE: -0.50
OD_AXIS: 113
OD_CYLINDER: -1.25
OS_SPHERE: -0.25
OS_AXIS: 077
OS_CYLINDER: -1.50

## 2023-06-07 ASSESSMENT — CONFRONTATIONAL VISUAL FIELD TEST (CVF)
OS_FINDINGS: FULL
OD_FINDINGS: FULL

## 2023-06-07 ASSESSMENT — VISUAL ACUITY
OD_BCVA: 20/30
OS_BCVA: 20/30

## 2023-06-07 ASSESSMENT — CORNEAL DYSTROPHY - POSTERIOR
OD_POSTERIORDYSTROPHY: 1+ GUTTATA
OS_POSTERIORDYSTROPHY: 1+ GUTTATA

## 2023-06-07 ASSESSMENT — TONOMETRY
OS_IOP_MMHG: 16
OD_IOP_MMHG: 17

## 2024-02-22 ENCOUNTER — OFFICE (OUTPATIENT)
Dept: URBAN - METROPOLITAN AREA CLINIC 92 | Facility: CLINIC | Age: 69
Setting detail: OPHTHALMOLOGY
End: 2024-02-22
Payer: COMMERCIAL

## 2024-02-22 DIAGNOSIS — B30.1: ICD-10-CM

## 2024-02-22 DIAGNOSIS — Z96.1: ICD-10-CM

## 2024-02-22 DIAGNOSIS — H10.43: ICD-10-CM

## 2024-02-22 PROCEDURE — 92012 INTRM OPH EXAM EST PATIENT: CPT | Performed by: SPECIALIST

## 2024-02-22 ASSESSMENT — CORNEAL DYSTROPHY - POSTERIOR
OS_POSTERIORDYSTROPHY: 1+ GUTTATA
OD_POSTERIORDYSTROPHY: 1+ GUTTATA

## 2024-02-22 ASSESSMENT — LID EXAM ASSESSMENTS
OD_BLEPHARITIS: 1+
OD_COMMENTS: W/ COLLARETTES
OS_MEIBOMITIS: LLL LUL 2+
OS_BLEPHARITIS: 1+
OS_COMMENTS: W/ COLLARETTES
OD_MEIBOMITIS: RLL RUL 2+

## 2024-02-23 PROBLEM — H10.43 ALLERGIC CONJUNCTIVITIS: Status: ACTIVE | Noted: 2024-02-22

## 2024-02-23 ASSESSMENT — REFRACTION_MANIFEST
OS_AXIS: 73
OS_SPHERE: -0.50
OD_CYLINDER: -1.25
OS_VA1: 20/25
OD_ADD: +2.75
OD_VA1: 20/25
OD_ADD: +2.50
OU_VA: 20/25
OD_SPHERE: PLANO
OD_AXIS: 120
OD_SPHERE: +3.25
OD_AXIS: 107
OS_CYLINDER: -1.25
OS_CYLINDER: -0.75
OD_CYLINDER: -1.00
OS_ADD: +2.75
OS_AXIS: 80
OS_SPHERE: +4.75
OS_ADD: +2.50

## 2024-02-23 ASSESSMENT — REFRACTION_CURRENTRX
OS_OVR_VA: 20/
OD_ADD: +2.50
OD_OVR_VA: 20/
OS_AXIS: 78
OD_AXIS: 107
OS_VPRISM_DIRECTION: BF
OS_SPHERE: +4.50
OS_CYLINDER: -1.25
OD_CYLINDER: -1.75
OS_ADD: +2.50
OD_VPRISM_DIRECTION: BF
OD_SPHERE: +4.25

## 2024-02-23 ASSESSMENT — SPHEQUIV_DERIVED
OS_SPHEQUIV: 4.375
OS_SPHEQUIV: -1
OD_SPHEQUIV: 2.75
OD_SPHEQUIV: -1.125
OS_SPHEQUIV: -1.125

## 2024-02-23 ASSESSMENT — REFRACTION_AUTOREFRACTION
OS_AXIS: 077
OS_SPHERE: -0.25
OD_SPHERE: -0.50
OD_CYLINDER: -1.25
OD_AXIS: 113
OS_CYLINDER: -1.50

## 2024-04-15 ENCOUNTER — NON-APPOINTMENT (OUTPATIENT)
Age: 69
End: 2024-04-15

## 2025-05-07 ENCOUNTER — OFFICE (OUTPATIENT)
Dept: URBAN - METROPOLITAN AREA CLINIC 76 | Facility: CLINIC | Age: 70
Setting detail: OPHTHALMOLOGY
End: 2025-05-07
Payer: MEDICARE

## 2025-05-07 DIAGNOSIS — H21.231: ICD-10-CM

## 2025-05-07 DIAGNOSIS — E11.9: ICD-10-CM

## 2025-05-07 DIAGNOSIS — H43.813: ICD-10-CM

## 2025-05-07 DIAGNOSIS — H10.45: ICD-10-CM

## 2025-05-07 DIAGNOSIS — H01.002: ICD-10-CM

## 2025-05-07 DIAGNOSIS — H01.005: ICD-10-CM

## 2025-05-07 DIAGNOSIS — Z96.1: ICD-10-CM

## 2025-05-07 DIAGNOSIS — H01.004: ICD-10-CM

## 2025-05-07 DIAGNOSIS — H01.001: ICD-10-CM

## 2025-05-07 DIAGNOSIS — H18.513: ICD-10-CM

## 2025-05-07 PROCEDURE — 92014 COMPRE OPH EXAM EST PT 1/>: CPT | Performed by: SPECIALIST

## 2025-05-07 ASSESSMENT — REFRACTION_MANIFEST
OS_CYLINDER: -2.25
OS_ADD: +2.50
OD_ADD: +2.75
OD_SPHERE: PLANO
OU_VA: 20/25
OD_ADD: +2.50
OS_AXIS: 73
OS_SPHERE: -0.50
OD_AXIS: 106
OD_SPHERE: +0.25
OS_VA1: 20/25
OD_AXIS: 107
OS_SPHERE: +0.50
OS_ADD: +2.75
OD_CYLINDER: -1.25
OD_CYLINDER: -1.75
OS_AXIS: 78
OS_CYLINDER: -1.25
OD_VA1: 20/20-

## 2025-05-07 ASSESSMENT — KERATOMETRY
OD_AXISANGLE_DEGREES: 110
OS_K2POWER_DIOPTERS: 48.75
OD_K1POWER_DIOPTERS: 47.75
OS_K1POWER_DIOPTERS: 47.75
OS_AXISANGLE_DEGREES: 34
OD_K2POWER_DIOPTERS: 49.00
METHOD_AUTO_MANUAL: AUTO

## 2025-05-07 ASSESSMENT — REFRACTION_CURRENTRX
OS_SPHERE: PLANO
OD_ADD: +2.75
OS_ADD: +2.75
OD_VPRISM_DIRECTION: BF
OD_OVR_VA: 20/
OS_CYLINDER: -1.25
OS_AXIS: 84
OD_SPHERE: PLANO
OD_CYLINDER: -1.25
OD_AXIS: 121
OS_VPRISM_DIRECTION: BF
OS_OVR_VA: 20/

## 2025-05-07 ASSESSMENT — REFRACTION_AUTOREFRACTION
OD_AXIS: 106
OS_SPHERE: +0.50
OS_AXIS: 78
OD_SPHERE: +0.50
OD_CYLINDER: -1.75
OS_CYLINDER: -2.25

## 2025-05-07 ASSESSMENT — LID EXAM ASSESSMENTS
OD_BLEPHARITIS: RLL RUL 1+
OS_BLEPHARITIS: LLL LUL 1+

## 2025-05-07 ASSESSMENT — VISUAL ACUITY
OD_BCVA: 20/30+
OS_BCVA: 20/25

## 2025-05-07 ASSESSMENT — CORNEAL DYSTROPHY - POSTERIOR
OD_POSTERIORDYSTROPHY: 1+ GUTTATA
OS_POSTERIORDYSTROPHY: 1+ GUTTATA

## 2025-05-07 ASSESSMENT — CONFRONTATIONAL VISUAL FIELD TEST (CVF)
OD_FINDINGS: FULL
OS_FINDINGS: FULL